# Patient Record
Sex: FEMALE
[De-identification: names, ages, dates, MRNs, and addresses within clinical notes are randomized per-mention and may not be internally consistent; named-entity substitution may affect disease eponyms.]

---

## 2018-07-11 PROBLEM — Z00.00 ENCOUNTER FOR PREVENTIVE HEALTH EXAMINATION: Status: ACTIVE | Noted: 2018-07-11

## 2018-09-21 ENCOUNTER — APPOINTMENT (OUTPATIENT)
Dept: OBGYN | Facility: CLINIC | Age: 53
End: 2018-09-21

## 2018-12-21 ENCOUNTER — APPOINTMENT (OUTPATIENT)
Dept: GASTROENTEROLOGY | Facility: CLINIC | Age: 53
End: 2018-12-21

## 2022-10-07 ENCOUNTER — APPOINTMENT (OUTPATIENT)
Dept: ORTHOPEDIC SURGERY | Facility: CLINIC | Age: 57
End: 2022-10-07

## 2022-10-07 VITALS
HEART RATE: 90 BPM | BODY MASS INDEX: 35.68 KG/M2 | SYSTOLIC BLOOD PRESSURE: 118 MMHG | WEIGHT: 209 LBS | DIASTOLIC BLOOD PRESSURE: 86 MMHG | HEIGHT: 64 IN

## 2022-10-07 DIAGNOSIS — M79.646 PAIN IN UNSPECIFIED FINGER(S): ICD-10-CM

## 2022-10-07 DIAGNOSIS — M65.30 TRIGGER FINGER, UNSPECIFIED FINGER: ICD-10-CM

## 2022-10-07 PROCEDURE — 20550 NJX 1 TENDON SHEATH/LIGAMENT: CPT | Mod: 59,F9

## 2022-10-07 PROCEDURE — 99204 OFFICE O/P NEW MOD 45 MIN: CPT | Mod: 25

## 2022-10-07 PROCEDURE — 73110 X-RAY EXAM OF WRIST: CPT | Mod: 50

## 2022-10-07 NOTE — HISTORY OF PRESENT ILLNESS
[FreeTextEntry1] : Kandi is a pleasant 57-year-old female coming to me with a complaint of right ring and small finger pain with significant triggering.

## 2022-10-07 NOTE — PHYSICAL EXAM
[de-identified] : General Exam:\par \par [The patient is alert and oriented, is well appearing, and in no apparent distress]\par [Cervical spine mobility is full in all directions]\par [There are no apparent skin lesions, ulcers, or masses]\par [Inspection of the extremities shows no signs of skin changes or muscle asymmetry]\par \par Examination of her right hand reveals significant tenderness overlying the ring and small finger A1 pulleys with positive triggering [de-identified] : [4] views of bilateral hands and wrists were obtained today in my office and were seen by me and discussed with the patient. \par These do show findings consistent with grossly preserved radiocarpal joint spaces\par \par

## 2022-10-07 NOTE — DISCUSSION/SUMMARY
[FreeTextEntry1] : 1.  I am hopeful that the injection to her right sided ring and small finger will be of significant benefit for her\par #2 she can see me on an as-needed basis

## 2022-10-07 NOTE — ASSESSMENT
[FreeTextEntry1] : ASSESSMENT:\par \par The patient comes in today with acute complaints of right ring and small finger pain complicated by significant triggering that happens with most activities of daily living.  This is aggravating her significantly \par \par [The patient was given an injection today.]\par INJECTION right TRIGGER finger of the ring and small\par \par The risks benefits and alternatives of treatment were discussed ranging from conservative to operative forms of treatment.  \par This included (but was not limited to) pain, infection, subcutaneous atrophy, skin depigmentation, tendon rupture, recurrence, \par incomplete relief of symptoms, tissue loss. They elected to undergo the steroid injection. \par \par Under informed consent and sterile conditions, 1/2 ml of 1% lidocaine and 1/2 ml of Celestone was precisely injected into the right ring and small finger A1 pulleys on separate injections\par A sterile Band-Aid was placed and a home program was elected over occupational therapy. \par \par It is my hope that this significantly alleviates the patient's symptoms. If symptoms are not fully resolved in the next 4-6 weeks they were encouraged to return to the office for reevaluation.\par \par \par She was adequately and thoroughly informed of my assessment of their current condition(s). \par \par \par

## 2022-10-07 NOTE — REVIEW OF SYSTEMS
[Left] : left [Joint Pain] : joint pain [Joint Stiffness] : joint stiffness [Negative] : Allergic/Immunologic

## 2022-11-04 ENCOUNTER — APPOINTMENT (OUTPATIENT)
Dept: OBGYN | Facility: CLINIC | Age: 57
End: 2022-11-04

## 2022-11-04 ENCOUNTER — NON-APPOINTMENT (OUTPATIENT)
Age: 57
End: 2022-11-04

## 2022-11-04 VITALS
DIASTOLIC BLOOD PRESSURE: 70 MMHG | WEIGHT: 216 LBS | SYSTOLIC BLOOD PRESSURE: 124 MMHG | BODY MASS INDEX: 36.88 KG/M2 | HEIGHT: 64 IN

## 2022-11-04 DIAGNOSIS — R10.2 PELVIC AND PERINEAL PAIN: ICD-10-CM

## 2022-11-04 DIAGNOSIS — Z83.3 FAMILY HISTORY OF DIABETES MELLITUS: ICD-10-CM

## 2022-11-04 PROCEDURE — 99386 PREV VISIT NEW AGE 40-64: CPT

## 2022-11-04 RX ORDER — ERGOCALCIFEROL 1.25 MG/1
1.25 MG CAPSULE, LIQUID FILLED ORAL
Qty: 4 | Refills: 0 | Status: ACTIVE | COMMUNITY
Start: 2022-08-27

## 2022-11-04 NOTE — PLAN
[FreeTextEntry1] : Encounter for GYN exam \par \par - Pap obtained\par - Mammogram from 2022 reviewed - BIRAD 1 \par - Mammogram/dexa ordered for 2023 \par \par Female pelvic pain \par \par - US ordered \par \par Will call patient with results of pelvic us \par All questions and concerns addressed during encounter. Pt. agreed to plan of care.

## 2022-11-04 NOTE — HISTORY OF PRESENT ILLNESS
[N] : Patient is not sexually active [Y] : Positive pregnancy history [Menarche Age: ____] : age at menarche was [unfilled] [Menopause Age: ____] : age at menopause was [unfilled] [FreeTextEntry1] : 57 year old female presents for annual examination. Pt. is , no complaints of vaginal dryness. Pt. had mammogram performed and DEXA performed in 2021. Pt. complains of some intermittent pelvic pain. No other complaints today.  [LMPDate] : 1 [PGxTotal] : 1 [facility] : [facility] [PGHxPremature] : 0 [PGHxAbortions] : 0 [[facility] Hospitaliving] : 1 [PGHxABInduced] : 0 [PGHxABSpont] : 0 [PGHxEctopic] : 0 [PGHxMultBirths] : 0

## 2022-11-07 LAB — HPV HIGH+LOW RISK DNA PNL CVX: NOT DETECTED

## 2023-11-17 ENCOUNTER — LABORATORY RESULT (OUTPATIENT)
Age: 58
End: 2023-11-17

## 2023-11-17 ENCOUNTER — APPOINTMENT (OUTPATIENT)
Dept: OBGYN | Facility: CLINIC | Age: 58
End: 2023-11-17
Payer: MEDICAID

## 2023-11-17 VITALS
WEIGHT: 213 LBS | BODY MASS INDEX: 36.37 KG/M2 | HEIGHT: 64 IN | SYSTOLIC BLOOD PRESSURE: 126 MMHG | DIASTOLIC BLOOD PRESSURE: 82 MMHG

## 2023-11-17 DIAGNOSIS — Z13.820 ENCOUNTER FOR SCREENING FOR OSTEOPOROSIS: ICD-10-CM

## 2023-11-17 DIAGNOSIS — Z12.39 ENCOUNTER FOR OTHER SCREENING FOR MALIGNANT NEOPLASM OF BREAST: ICD-10-CM

## 2023-11-17 DIAGNOSIS — Z01.411 ENCOUNTER FOR GYNECOLOGICAL EXAMINATION (GENERAL) (ROUTINE) WITH ABNORMAL FINDINGS: ICD-10-CM

## 2023-11-17 PROCEDURE — 99396 PREV VISIT EST AGE 40-64: CPT

## 2023-11-17 RX ORDER — CYANOCOBALAMIN 1000 UG/ML
1000 INJECTION INTRAMUSCULAR; SUBCUTANEOUS
Qty: 1 | Refills: 0 | Status: DISCONTINUED | COMMUNITY
Start: 2022-06-04 | End: 2023-11-17

## 2023-11-20 LAB — HPV HIGH+LOW RISK DNA PNL CVX: DETECTED

## 2023-11-22 LAB — CYTOLOGY CVX/VAG DOC THIN PREP: NORMAL

## 2023-12-12 ENCOUNTER — TRANSCRIPTION ENCOUNTER (OUTPATIENT)
Age: 58
End: 2023-12-12

## 2024-11-22 ENCOUNTER — APPOINTMENT (OUTPATIENT)
Dept: OBGYN | Facility: CLINIC | Age: 59
End: 2024-11-22
Payer: MEDICAID

## 2024-11-22 VITALS
HEIGHT: 64 IN | WEIGHT: 212 LBS | SYSTOLIC BLOOD PRESSURE: 122 MMHG | DIASTOLIC BLOOD PRESSURE: 80 MMHG | BODY MASS INDEX: 36.19 KG/M2

## 2024-11-22 DIAGNOSIS — Z12.39 ENCOUNTER FOR OTHER SCREENING FOR MALIGNANT NEOPLASM OF BREAST: ICD-10-CM

## 2024-11-22 DIAGNOSIS — Z13.820 ENCOUNTER FOR SCREENING FOR OSTEOPOROSIS: ICD-10-CM

## 2024-11-22 DIAGNOSIS — Z01.411 ENCOUNTER FOR GYNECOLOGICAL EXAMINATION (GENERAL) (ROUTINE) WITH ABNORMAL FINDINGS: ICD-10-CM

## 2024-11-22 PROCEDURE — 99459 PELVIC EXAMINATION: CPT

## 2024-11-22 PROCEDURE — 99396 PREV VISIT EST AGE 40-64: CPT

## 2024-11-23 ENCOUNTER — NON-APPOINTMENT (OUTPATIENT)
Age: 59
End: 2024-11-23

## 2024-11-25 LAB — HPV HIGH+LOW RISK DNA PNL CVX: NOT DETECTED

## 2024-11-30 LAB — CYTOLOGY CVX/VAG DOC THIN PREP: NORMAL

## 2025-06-27 ENCOUNTER — APPOINTMENT (OUTPATIENT)
Age: 60
End: 2025-06-27
Payer: MEDICAID

## 2025-06-27 PROCEDURE — 99204 OFFICE O/P NEW MOD 45 MIN: CPT

## 2025-06-27 RX ORDER — MELOXICAM 7.5 MG/1
7.5 TABLET ORAL TWICE DAILY
Qty: 30 | Refills: 1 | Status: ACTIVE | COMMUNITY
Start: 2025-06-27 | End: 1900-01-01

## 2025-06-27 RX ORDER — CLOTRIMAZOLE 10 MG/G
1 CREAM TOPICAL
Qty: 1 | Refills: 4 | Status: ACTIVE | COMMUNITY
Start: 2025-06-27 | End: 1900-01-01

## 2025-07-18 ENCOUNTER — APPOINTMENT (OUTPATIENT)
Age: 60
End: 2025-07-18